# Patient Record
Sex: MALE | Race: WHITE | NOT HISPANIC OR LATINO | Employment: UNEMPLOYED | URBAN - NONMETROPOLITAN AREA
[De-identification: names, ages, dates, MRNs, and addresses within clinical notes are randomized per-mention and may not be internally consistent; named-entity substitution may affect disease eponyms.]

---

## 2023-11-04 ENCOUNTER — APPOINTMENT (OUTPATIENT)
Dept: GENERAL RADIOLOGY | Facility: HOSPITAL | Age: 23
End: 2023-11-04

## 2023-11-04 ENCOUNTER — HOSPITAL ENCOUNTER (EMERGENCY)
Facility: HOSPITAL | Age: 23
Discharge: HOME | End: 2023-11-04
Attending: EMERGENCY MEDICINE | Admitting: EMERGENCY MEDICINE

## 2023-11-04 VITALS
WEIGHT: 132 LBS | HEART RATE: 64 BPM | HEIGHT: 71 IN | OXYGEN SATURATION: 99 % | RESPIRATION RATE: 14 BRPM | SYSTOLIC BLOOD PRESSURE: 125 MMHG | BODY MASS INDEX: 18.48 KG/M2 | TEMPERATURE: 97.9 F | DIASTOLIC BLOOD PRESSURE: 94 MMHG

## 2023-11-04 DIAGNOSIS — S43.102A SEPARATION OF LEFT ACROMIOCLAVICULAR JOINT, INITIAL ENCOUNTER: Primary | ICD-10-CM

## 2023-11-04 PROCEDURE — 25010000002 MORPHINE PER 10 MG: Performed by: EMERGENCY MEDICINE

## 2023-11-04 PROCEDURE — 25010000002 ONDANSETRON PER 1 MG: Performed by: EMERGENCY MEDICINE

## 2023-11-04 PROCEDURE — 73050 X-RAY EXAM OF SHOULDERS: CPT

## 2023-11-04 PROCEDURE — 96375 TX/PRO/DX INJ NEW DRUG ADDON: CPT

## 2023-11-04 PROCEDURE — 73050 X-RAY EXAM OF SHOULDERS: CPT | Performed by: RADIOLOGY

## 2023-11-04 PROCEDURE — 96374 THER/PROPH/DIAG INJ IV PUSH: CPT

## 2023-11-04 PROCEDURE — 99283 EMERGENCY DEPT VISIT LOW MDM: CPT

## 2023-11-04 PROCEDURE — 73030 X-RAY EXAM OF SHOULDER: CPT | Performed by: RADIOLOGY

## 2023-11-04 PROCEDURE — 73030 X-RAY EXAM OF SHOULDER: CPT

## 2023-11-04 RX ORDER — ONDANSETRON 2 MG/ML
4 INJECTION INTRAMUSCULAR; INTRAVENOUS ONCE
Status: COMPLETED | OUTPATIENT
Start: 2023-11-04 | End: 2023-11-04

## 2023-11-04 RX ORDER — SODIUM CHLORIDE 0.9 % (FLUSH) 0.9 %
10 SYRINGE (ML) INJECTION AS NEEDED
Status: DISCONTINUED | OUTPATIENT
Start: 2023-11-04 | End: 2023-11-04 | Stop reason: HOSPADM

## 2023-11-04 RX ADMIN — MORPHINE SULFATE 4 MG: 4 INJECTION, SOLUTION INTRAMUSCULAR; INTRAVENOUS at 17:19

## 2023-11-04 RX ADMIN — ONDANSETRON 4 MG: 2 INJECTION INTRAMUSCULAR; INTRAVENOUS at 17:19

## 2023-11-04 NOTE — ED PROVIDER NOTES
Subjective   History of Present Illness  23-year-old male presents secondary to left shoulder injury.  Patient was wrestling for a collegiate tournament.  He is uncertain what exactly happened during a takedown however he has had extreme pain since.  Patient denies any head injury, neck injury, back injury, rib injury.  He has no past medical problems.  He voices no other complaints this time.  He presented by private vehicle.        Review of Systems   Constitutional: Negative.  Negative for fever.   HENT: Negative.     Respiratory: Negative.     Cardiovascular: Negative.  Negative for chest pain.   Gastrointestinal: Negative.  Negative for abdominal pain.   Endocrine: Negative.    Genitourinary: Negative.  Negative for dysuria.   Skin: Negative.    Neurological: Negative.    Psychiatric/Behavioral: Negative.     All other systems reviewed and are negative.      History reviewed. No pertinent past medical history.    No Known Allergies    Past Surgical History:   Procedure Laterality Date    APPENDECTOMY         History reviewed. No pertinent family history.    Social History     Socioeconomic History    Marital status: Single           Objective   Physical Exam  Vitals and nursing note reviewed.   Constitutional:       General: He is not in acute distress.     Appearance: He is well-developed. He is not diaphoretic.   HENT:      Head: Normocephalic and atraumatic.      Right Ear: External ear normal.      Left Ear: External ear normal.      Nose: Nose normal.   Eyes:      Conjunctiva/sclera: Conjunctivae normal.      Pupils: Pupils are equal, round, and reactive to light.   Neck:      Vascular: No JVD.      Trachea: No tracheal deviation.   Cardiovascular:      Rate and Rhythm: Normal rate and regular rhythm.      Heart sounds: Normal heart sounds. No murmur heard.  Pulmonary:      Effort: Pulmonary effort is normal. No respiratory distress.      Breath sounds: Normal breath sounds. No wheezing.   Abdominal:       General: Bowel sounds are normal.      Palpations: Abdomen is soft.      Tenderness: There is no abdominal tenderness.   Musculoskeletal:         General: No deformity. Normal range of motion.      Cervical back: Normal range of motion and neck supple.      Comments: Tenderness left shoulder.  Neurovascular intact distally.   Skin:     General: Skin is warm and dry.      Coloration: Skin is not pale.      Findings: No erythema or rash.   Neurological:      Mental Status: He is alert and oriented to person, place, and time.      Cranial Nerves: No cranial nerve deficit.   Psychiatric:         Behavior: Behavior normal.         Thought Content: Thought content normal.         Procedures  XR Acromioclavicular Joints Bilateral With & Without Weights   Final Result   1. Left acromioclavicular joint separation as above.   2. No acute fracture.       This report was finalized on 11/4/2023 8:49 PM by Leonides Cassidy MD.          XR Shoulder 2+ View Left   Final Result   1. Likely mild acromioclavicular joint separation. Mild   acromioclavicular joint separation, age indeterminate.   2. No acute fracture. Acromioclavicular acromioclavicular examination   three-view left shoulder       This report was finalized on 11/4/2023 6:26 PM by Leonides Cassidy MD.                     ED Course  ED Course as of 11/04/23 2114   Sat Nov 04, 2023 1852 Care assumed from Vasu Serrano at the end of his shift.  Reviewed results of x-rays with patient.  We will plan AC films per radiology review.  Care endorsed to Dr. Christensen at shift change. [BC]   2113 I assumed patient's care from Dr. Cespedes at shift change.  Please see previous documentation above.  Patient studies have now been resulted.  Patient is doing well, in no distress.  Patient, his  and I discussed his test results, his diagnosis and his plan of care.  They voiced understanding and agreement.  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  He will follow-up with his orthopedic surgeon on Monday.  He will return to the emergency department right away for any problems. [CM]      ED Course User Index  [BC] Godwin Cespedes MD  [CM] Armani Christensen MD                                           Medical Decision Making  Problems Addressed:  Separation of left acromioclavicular joint, initial encounter: acute illness or injury    Amount and/or Complexity of Data Reviewed  Radiology: ordered. Decision-making details documented in ED Course.    Risk  OTC drugs.  Prescription drug management.  Parenteral controlled substances.        Final diagnoses:   Separation of left acromioclavicular joint, initial encounter       ED Disposition  ED Disposition       ED Disposition   Discharge    Condition   Stable    Comment   --               Your team orthopedic surgeon in [place]    Go to   Monday    [facility] EMERGENCY DEPARTMENT  [address] [place] [id number] [phone]  Go to   If symptoms worsen         Medication List      No changes were made to your prescriptions during this visit.       Please note that portions of this note were completed with a voice recognition program.        Armani Christensen MD  11/04/23 [id number]

## 2023-11-05 NOTE — DISCHARGE INSTRUCTIONS
Home with your team.  Wear the left arm sling.  Over-the-counter ibuprofen as directed as needed.  See your team orthopedic surgeon in [place] on Monday.  Return to the emergency department right away if symptoms worsen/any problems.